# Patient Record
Sex: FEMALE | Race: WHITE | NOT HISPANIC OR LATINO | URBAN - METROPOLITAN AREA
[De-identification: names, ages, dates, MRNs, and addresses within clinical notes are randomized per-mention and may not be internally consistent; named-entity substitution may affect disease eponyms.]

---

## 2017-05-25 ENCOUNTER — IMPORTED ENCOUNTER (OUTPATIENT)
Dept: URBAN - METROPOLITAN AREA CLINIC 50 | Facility: CLINIC | Age: 57
End: 2017-05-25

## 2017-05-26 NOTE — PATIENT DISCUSSION
No NEW retinal holes or tears seen on exam. Recommended observation. We reviewed the signs and symptoms of retinal tear/retinal detachment and the importance of prompt evaluation should there be increasing floaters, new flashing lights, or decreasing peripheral vision in either eye at any time. Patient understands condition, prognosis and need for follow up care.

## 2017-06-02 ENCOUNTER — IMPORTED ENCOUNTER (OUTPATIENT)
Dept: URBAN - METROPOLITAN AREA CLINIC 50 | Facility: CLINIC | Age: 57
End: 2017-06-02

## 2017-11-07 ENCOUNTER — IMPORTED ENCOUNTER (OUTPATIENT)
Dept: URBAN - METROPOLITAN AREA CLINIC 50 | Facility: CLINIC | Age: 57
End: 2017-11-07

## 2018-12-11 ENCOUNTER — IMPORTED ENCOUNTER (OUTPATIENT)
Dept: URBAN - METROPOLITAN AREA CLINIC 50 | Facility: CLINIC | Age: 58
End: 2018-12-11

## 2019-02-27 ENCOUNTER — IMPORTED ENCOUNTER (OUTPATIENT)
Dept: URBAN - METROPOLITAN AREA CLINIC 50 | Facility: CLINIC | Age: 59
End: 2019-02-27

## 2019-12-10 ENCOUNTER — IMPORTED ENCOUNTER (OUTPATIENT)
Dept: URBAN - METROPOLITAN AREA CLINIC 50 | Facility: CLINIC | Age: 59
End: 2019-12-10

## 2020-12-15 ENCOUNTER — IMPORTED ENCOUNTER (OUTPATIENT)
Dept: URBAN - METROPOLITAN AREA CLINIC 50 | Facility: CLINIC | Age: 60
End: 2020-12-15

## 2021-04-18 ASSESSMENT — VISUAL ACUITY
OS_CC: 20/20-
OS_OTHER: 20/25. 20/40.
OS_BAT: 20/25
OS_CC: J1+
OS_BAT: 20/50
OS_CC: 20/20
OD_CC: 20/20
OD_CC: 20/30+
OS_CC: 20/25=
OS_CC: J1@ 15 IN
OS_CC: 20/25-2
OD_CC: 20/20-
OD_CC: J1+
OD_BAT: 20/60
OD_CC: J1+
OS_OTHER: 20/50. 20/80.
OD_CC: J1@ 15 IN
OD_CC: J1+
OS_CC: 20/20
OD_OTHER: 20/20. 20/40.
OD_CC: 20/30-2
OS_CC: J1+
OD_CC: 20/20
OD_OTHER: 20/60. 20/70.
OS_CC: J1+
OD_BAT: 20/20

## 2021-04-18 ASSESSMENT — TONOMETRY
OS_IOP_MMHG: 13
OS_IOP_MMHG: 14
OD_IOP_MMHG: 14
OS_IOP_MMHG: 16
OD_IOP_MMHG: 19
OS_IOP_MMHG: 18
OS_IOP_MMHG: 15
OD_IOP_MMHG: 13
OD_IOP_MMHG: 14
OD_IOP_MMHG: 14

## 2021-12-09 ENCOUNTER — PREPPED CHART (OUTPATIENT)
Dept: URBAN - METROPOLITAN AREA CLINIC 50 | Facility: CLINIC | Age: 61
End: 2021-12-09

## 2021-12-14 ENCOUNTER — COMPREHENSIVE EXAM (OUTPATIENT)
Dept: URBAN - METROPOLITAN AREA CLINIC 50 | Facility: CLINIC | Age: 61
End: 2021-12-14

## 2021-12-14 DIAGNOSIS — Z01.01: ICD-10-CM

## 2021-12-14 PROCEDURE — 92015 DETERMINE REFRACTIVE STATE: CPT

## 2021-12-14 PROCEDURE — 92014 COMPRE OPH EXAM EST PT 1/>: CPT

## 2021-12-14 ASSESSMENT — TONOMETRY
OS_IOP_MMHG: 14
OD_IOP_MMHG: 14

## 2021-12-14 ASSESSMENT — VISUAL ACUITY
OU_CC: 20/20
OU_CC: J1+
OD_GLARE: 20/30
OD_CC: 20/20
OS_GLARE: 20/20
OS_CC: 20/20
OD_GLARE: 20/40
OS_GLARE: 20/30